# Patient Record
Sex: FEMALE | Race: OTHER | Employment: STUDENT | URBAN - METROPOLITAN AREA
[De-identification: names, ages, dates, MRNs, and addresses within clinical notes are randomized per-mention and may not be internally consistent; named-entity substitution may affect disease eponyms.]

---
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
## 2023-01-19 NOTE — PROGRESS NOTES
Agnieszka Ge (:  2005) is a 19 y.o. female,New patient, here for evaluation of the following chief complaint(s):  New Patient    History significant for anxiety. ASSESSMENT/PLAN:  1. Anxiety-controlled. Patient has been experiencing anxiety attack for almost 5 to 6 years. 2 years ago there was a discussion for possibly starting medication but the patient opted not to at that time. Lately for the past 6 to 8 months, her anxiety is getting worse. It is mainly because she lost 3 of her relatives suddenly. Her older cousin was drawn after having seizure episode in the pool, other concern passed in sleep and one of her aunt passed in an accident. She gets disturbed sleep. Her appetite is low and she likes to snacks intermittently between. CBT, will start on BuSpar. Referral to psychologist made. -     Ambulatory referral to Psychology  -     busPIRone (BUSPAR) 5 MG tablet; Take 1 tablet by mouth 2 times daily, Disp-60 tablet, R-2Normal  2. Sleep disturbance-likely anxiety related. Recommended to make adequate sleep hygiene, fixed time to go to bed and avoid daytime naps. Patient agreeable. -     Ambulatory referral to Psychology    Return in about 3 months (around 2023). Subjective   SUBJECTIVE/OBJECTIVE:  HPI    Review of Systems   Constitutional:  Negative for chills, fatigue and fever. HENT:  Negative for congestion, ear pain, rhinorrhea and sore throat. Eyes:  Negative for discharge, redness and visual disturbance. Respiratory:  Negative for cough and chest tightness. Cardiovascular:  Negative for chest pain, palpitations and leg swelling. Gastrointestinal:  Negative for abdominal pain, nausea and vomiting. Endocrine: Negative. Genitourinary:  Negative for dysuria. Musculoskeletal:  Negative for back pain and gait problem. Skin: Negative. Neurological:  Negative for syncope, facial asymmetry, speech difficulty, light-headedness and headaches. Psychiatric/Behavioral:  Positive for sleep disturbance. Objective   Physical Exam  Vitals reviewed. Constitutional:       Appearance: Normal appearance. HENT:      Head: Normocephalic. Eyes:      Extraocular Movements: Extraocular movements intact. Pupils: Pupils are equal, round, and reactive to light. Cardiovascular:      Rate and Rhythm: Normal rate. Heart sounds: Normal heart sounds. No murmur heard. Pulmonary:      Effort: Pulmonary effort is normal.      Breath sounds: Normal breath sounds. Abdominal:      General: Bowel sounds are normal.      Palpations: Abdomen is soft. Musculoskeletal:         General: Normal range of motion. Skin:     General: Skin is warm. Neurological:      General: No focal deficit present. Mental Status: She is alert and oriented to person, place, and time. Mental status is at baseline. Psychiatric:         Mood and Affect: Mood normal.                An electronic signature was used to authenticate this note.     --Roque Dave MD

## 2023-01-20 ENCOUNTER — OFFICE VISIT (OUTPATIENT)
Dept: INTERNAL MEDICINE CLINIC | Age: 18
End: 2023-01-20
Payer: COMMERCIAL

## 2023-01-20 VITALS
SYSTOLIC BLOOD PRESSURE: 124 MMHG | OXYGEN SATURATION: 98 % | WEIGHT: 192.12 LBS | TEMPERATURE: 97.5 F | HEART RATE: 79 BPM | DIASTOLIC BLOOD PRESSURE: 65 MMHG

## 2023-01-20 DIAGNOSIS — G47.9 SLEEP DISTURBANCE: ICD-10-CM

## 2023-01-20 DIAGNOSIS — F41.9 ANXIETY: Primary | ICD-10-CM

## 2023-01-20 PROCEDURE — G8421 BMI NOT CALCULATED: HCPCS | Performed by: STUDENT IN AN ORGANIZED HEALTH CARE EDUCATION/TRAINING PROGRAM

## 2023-01-20 PROCEDURE — G8427 DOCREV CUR MEDS BY ELIG CLIN: HCPCS | Performed by: STUDENT IN AN ORGANIZED HEALTH CARE EDUCATION/TRAINING PROGRAM

## 2023-01-20 PROCEDURE — 1036F TOBACCO NON-USER: CPT | Performed by: STUDENT IN AN ORGANIZED HEALTH CARE EDUCATION/TRAINING PROGRAM

## 2023-01-20 PROCEDURE — G8484 FLU IMMUNIZE NO ADMIN: HCPCS | Performed by: STUDENT IN AN ORGANIZED HEALTH CARE EDUCATION/TRAINING PROGRAM

## 2023-01-20 PROCEDURE — 99204 OFFICE O/P NEW MOD 45 MIN: CPT | Performed by: STUDENT IN AN ORGANIZED HEALTH CARE EDUCATION/TRAINING PROGRAM

## 2023-01-20 RX ORDER — BUSPIRONE HYDROCHLORIDE 5 MG/1
5 TABLET ORAL 2 TIMES DAILY
Qty: 60 TABLET | Refills: 2 | Status: SHIPPED | OUTPATIENT
Start: 2023-01-20 | End: 2023-04-20

## 2023-01-20 RX ORDER — BUSPIRONE HYDROCHLORIDE 5 MG/1
5 TABLET ORAL 2 TIMES DAILY
Qty: 60 TABLET | Refills: 0 | Status: SHIPPED | OUTPATIENT
Start: 2023-01-20 | End: 2023-01-20

## 2023-01-20 SDOH — ECONOMIC STABILITY: FOOD INSECURITY: WITHIN THE PAST 12 MONTHS, YOU WORRIED THAT YOUR FOOD WOULD RUN OUT BEFORE YOU GOT MONEY TO BUY MORE.: NEVER TRUE

## 2023-01-20 SDOH — ECONOMIC STABILITY: FOOD INSECURITY: WITHIN THE PAST 12 MONTHS, THE FOOD YOU BOUGHT JUST DIDN'T LAST AND YOU DIDN'T HAVE MONEY TO GET MORE.: NEVER TRUE

## 2023-01-20 ASSESSMENT — PATIENT HEALTH QUESTIONNAIRE - PHQ9
SUM OF ALL RESPONSES TO PHQ QUESTIONS 1-9: 1
1. LITTLE INTEREST OR PLEASURE IN DOING THINGS: 0
2. FEELING DOWN, DEPRESSED OR HOPELESS: 1
SUM OF ALL RESPONSES TO PHQ9 QUESTIONS 1 & 2: 1

## 2023-01-20 ASSESSMENT — ENCOUNTER SYMPTOMS
RHINORRHEA: 0
COUGH: 0
SORE THROAT: 0
EYE REDNESS: 0
VOMITING: 0
ABDOMINAL PAIN: 0
BACK PAIN: 0
CHEST TIGHTNESS: 0
EYE DISCHARGE: 0
NAUSEA: 0

## 2023-01-20 ASSESSMENT — SOCIAL DETERMINANTS OF HEALTH (SDOH): HOW HARD IS IT FOR YOU TO PAY FOR THE VERY BASICS LIKE FOOD, HOUSING, MEDICAL CARE, AND HEATING?: NOT HARD AT ALL

## 2023-02-03 ENCOUNTER — OFFICE VISIT (OUTPATIENT)
Dept: PSYCHOLOGY | Age: 18
End: 2023-02-03

## 2023-02-03 DIAGNOSIS — F33.2 SEVERE EPISODE OF RECURRENT MAJOR DEPRESSIVE DISORDER, WITHOUT PSYCHOTIC FEATURES (HCC): Primary | ICD-10-CM

## 2023-02-03 DIAGNOSIS — F43.21 GRIEF: ICD-10-CM

## 2023-02-03 ASSESSMENT — PATIENT HEALTH QUESTIONNAIRE - PHQ9
SUM OF ALL RESPONSES TO PHQ QUESTIONS 1-9: 21
9. THOUGHTS THAT YOU WOULD BE BETTER OFF DEAD, OR OF HURTING YOURSELF: 0
SUM OF ALL RESPONSES TO PHQ QUESTIONS 1-9: 21
7. TROUBLE CONCENTRATING ON THINGS, SUCH AS READING THE NEWSPAPER OR WATCHING TELEVISION: 2
4. FEELING TIRED OR HAVING LITTLE ENERGY: 3
1. LITTLE INTEREST OR PLEASURE IN DOING THINGS: 3
6. FEELING BAD ABOUT YOURSELF - OR THAT YOU ARE A FAILURE OR HAVE LET YOURSELF OR YOUR FAMILY DOWN: 2
SUM OF ALL RESPONSES TO PHQ9 QUESTIONS 1 & 2: 5
2. FEELING DOWN, DEPRESSED OR HOPELESS: 2
8. MOVING OR SPEAKING SO SLOWLY THAT OTHER PEOPLE COULD HAVE NOTICED. OR THE OPPOSITE, BEING SO FIGETY OR RESTLESS THAT YOU HAVE BEEN MOVING AROUND A LOT MORE THAN USUAL: 3
SUM OF ALL RESPONSES TO PHQ QUESTIONS 1-9: 21
SUM OF ALL RESPONSES TO PHQ QUESTIONS 1-9: 21
3. TROUBLE FALLING OR STAYING ASLEEP: 3
5. POOR APPETITE OR OVEREATING: 3

## 2023-02-03 ASSESSMENT — ANXIETY QUESTIONNAIRES
7. FEELING AFRAID AS IF SOMETHING AWFUL MIGHT HAPPEN: 3-NEARLY EVERY DAY
GAD7 TOTAL SCORE: 20
6. BECOMING EASILY ANNOYED OR IRRITABLE: 2-OVER HALF THE DAYS
4. TROUBLE RELAXING: 3-NEARLY EVERY DAY
5. BEING SO RESTLESS THAT IT IS HARD TO SIT STILL: 3-NEARLY EVERY DAY
3. WORRYING TOO MUCH ABOUT DIFFERENT THINGS: 3-NEARLY EVERY DAY
2. NOT BEING ABLE TO STOP OR CONTROL WORRYING: 3-NEARLY EVERY DAY
1. FEELING NERVOUS, ANXIOUS, OR ON EDGE: 3

## 2023-02-03 NOTE — PATIENT INSTRUCTIONS
The [facility]) Consultant giving you this message provides team-based care to treat the mind and body. He works directly with your doctor, who will always stay in charge of your care. Most [address] [place] [address] visits are 30 minutes or less. Usually, the [address] [place] [address] provider and your doctor continue to see you until you are starting to do better and have a good plan in place for continued progress. Once that happens, most patients follow-up with just their doctor (though the [address] [place] [address] provider remains available to you as needed). If you are not improving, or if you desire outside mental health treatment, or if your doctor recommends more specialized help, we will be happy to help you find a mental health specialist in the community. Please also note your health insurance may be billed for [facility] visits. Check with your insurance company, and tell the [address] [place] [address] provider, if you have any questions about your [address] [place] [address] coverage. Diaphragmatic Breathing Exercises    Exercise 1:  The Stimulating Breath (also called the Vaibhav Breath)  This exercise aims to raise energy level and increase alertness. Inhale and exhale rapidly through your nose, keeping your mouth closed but relaxed. Your breaths in and out should be equal in duration, but as short as possible. This is a noisy breathing exercise. Try for three in-and-out breath cycles per second. This produces a quick movement of the diaphragm, suggesting a vaibhav. Breathe normally after each cycle. Do not do for more than 15 seconds on your first try. Each time you practice the Stimulating Breath, you can increase your time by five seconds or so, until you reach a full minute. If done properly, you may feel invigorated, comparable to the heightened awareness you feel after a good workout. You should feel the effort at the back of the neck, the diaphragm, the chest and the abdomen.  Try this breathing exercise the next time you need an energy boost and feel yourself reaching for a cup of coffee. Exercise 2:  The 4-7-8 (or Relaxing Breath) Exercise  This exercise is utterly simple, takes almost no time, requires no equipment and can be done anywhere. Although you can do the exercise in any position, sit with your back straight while learning the exercise. Place the tip of your tongue against the ridge of tissue just behind your upper front teeth, and keep it there through the entire exercise. You will be exhaling through your mouth around your tongue; try pursing your lips slightly if this seems awkward. Exhale completely through your mouth, making a whoosh sound. Close your mouth and inhale quietly through your nose to a mental count of four. Hold your breath for a count of seven. Exhale completely through your mouth, making a whoosh sound to a count of eight. This is one breath. Now inhale again and repeat the cycle three more times for a total of four breaths. Note that you always inhale quietly through your nose and exhale audibly through your mouth. The tip of your tongue stays in position the whole time. Exhalation takes twice as long as inhalation. The absolute time you spend on each phase is not important; the ratio of 4:7:8 is important. If you have trouble holding your breath, speed the exercise up but keep to the ratio of 4:7:8 for the three phases. With practice you can slow it all down and get used to inhaling and exhaling more and more deeply. This exercise is a natural tranquilizer for the nervous system. Unlike tranquilizing drugs, which are often effective when you first take them but then lose their power over time, this exercise is subtle when you first try it but gains in power with repetition and practice. Do it at least twice a day. You cannot do it too frequently. Do NOT do more than 4 breaths at one time for the first month of practice. Later, if you wish, you can extend it to eight breaths.  If you feel a little lightheaded when you first breathe this way, do not be concerned; it will pass. Once you develop this technique by practicing it every day, it will be a very useful tool that you will always have with you. Use it whenever anything upsetting happens - before you react. Use it whenever you are aware of internal tension. Use it to help you fall asleep. This exercise cannot be recommended too highly. Everyone can benefit from it. Exercise 3:   Meditation Exercise: Breath Counting  If you want to get a feel for this challenging work, try your hand at breath counting, a deceptively simple technique. Sit in a comfortable position with the spine straight and head inclined slightly forward. Gently close your eyes and take a few deep breaths. Then let the breath come naturally without trying to influence it. Ideally it will be quiet and slow, but depth and rhythm may vary. To begin the exercise, count \"one\" to yourself as you exhale. The next time you exhale, count \"two,\" and so on up to [place]. \"  Then begin a new cycle, counting \"one\" on the next exhalation. Never count higher than \"five,\" and count only when you exhale. You will know your attention has wandered when you find yourself up to \"eight,\" \"12,\" even \"19. \"  Try to do 10 minutes of this form of meditation. Personal Thought  Control. Our thinking often creates anxiety for us. Getting better control of our thinking can go a long way in helping us cope. The following steps can be useful. Let yourself become aware of thoughts you have when you are anxious. What are the words that you are saying to yourself at that moment? Sometimes it takes a little practice before we become aware of our thoughts. Some examples might be:  I know something bad is going to happen, or This is horrible or Mariluz Neff is this happening to me!?  Write your thoughts down. Its much easier to work with our thoughts, analyze them, and replace them if they are in black and white.   Ask yourself the following questions about your thoughts:  Is it true? (Is it logically correct? Where is the evidence to support the truth of that thought? Are there alternative ways of thinking that would be more correct?). If a thought is not as true as it could be, replace it with a more realistic and helpful one. The majority of thoughts we have that generate anxiety are not the most realistic appraisals of the situation. So what? (If this is logically correct, what does it mean to me? Is there anything I can do about the situation? Is it in my best interest to get anxious about this?). Use coping self-statements. When feeling anxious, you may be able to tell yourself automatic phrases without thinking too much about it. A couple of examples would be phrases such as Its OK, I can handle it, or Ive been through things like this before and have done all right.   Notice that these statements tend to be true for all of us. Notice a change in your emotional state as you change your thinking. As your thoughts become more realistic, you will probably notice a decrease in anxiety and tension, and an increase in your ability to cope. Thinking Errors That Increase Stress, Anger, Depression, Anxiety, and Worry  All-or-Nothing Thinking. You see things in black-and-white categories. It is either one thing or another; there is no room for anything in between. I'm 100% healthy or I must have a fatal disease.   Jumping to Conclusions. You make a negative interpretation even though there are no definite facts that convincingly support your conclusion. My  is late because he has been in a car accident and is now injured on the side of the road.   Fortune-Telling. You anticipate things will turn out badly, convinced the prediction is a fact. Not getting this job will cause us to lose the house.   Should Statements. Musts and oughts are also offenders. Emotional consequences can include anxiety and anger.  I should be able to handle this.    Overgeneralization. Assuming one event is actually a pattern. My hand is a little shaky today, I must have Parkinson's disease.   Disqualifying the Positive. Filtering out or rejecting positive experiences to maintain negative beliefs. Upon hearing that your spouse has checked all the doors and windows and they are all locked you think, But someone could cut out a piece of glass and open the window.   Catastrophizing. Predicting the worst possible outcome imaginable. Terrible, awful, horrible, worst ever might be key words. If I can't get my heart to stop pounding I'm going to die.    Superstitious Thinking. The thought that something you do prevents something awful from happening. Jeevan Grupo my spouse a hug and telling her to be careful before going to work will prevent her from getting in a wreck. I do it every morning and she hasn't gotten in a wreck yet.   Emotional Reasoning. The belief that because you feel a certain way means that the assumptions and association you have with that feeling are true. The fear, doom, and constant anxiety must mean something is seriously wrong with me. 

## 2023-02-03 NOTE — PROGRESS NOTES
Behavioral Health Consultation  Dasha Garcia, Ph.D.  Psychologist  2/3/2023  9:30 AM EST      Time spent with Patient: 30 minutes  This is patient's first [facility] appointment. Reason for Consult: depression; anxiety; grief  Referring Provider: No primary care provider on file. No primary provider on file. Feedback for PCP:     Pt provided informed consent for the behavioral health program. Discussed with patient model of service to include the limits of confidentiality (i.e. abuse reporting, suicide intervention, etc.) and short-term intervention focused approach. Pt indicated understanding. Feedback given to PCP. S:  The patient's PHQ is 21, which is in the severe depression range. The patient presents with multiple psychosocial stressors, with multiple deaths in her family and extended family from 2017 to a death this year. She said that a cousin her age  this year, and that her death shook due to they are both the same age. She also says that she has social anxiety disorder that frequently makes communication anxiety producing. She says that her \"people skills\" could use some help. She works at a , which she enjoys as the kids keep her busy. She says everyday tasks are stressful and that it's hard for her to focus on self care.          Mental health history:     Social History     Tobacco Use    Smoking status: Never    Smokeless tobacco: Never   Substance Use Topics    Alcohol use: Never        Illicit drugs:   Social History     Substance and Sexual Activity   Drug Use Yes    Types: Marijuana [employer])    Comment: ocassionally        O:  MSE:  Appearance: good hygiene   Attitude: cooperative  Consciousness: alert  Orientation: oriented to person, place, time, general circumstance  Memory: recent and remote memory intact  Attention/Concentration: intact during session  Psychomotor Activity: normal  Eye Contact: normal  Speech: low volume  Mood: introverted, anxious   Affect: congruent  Perception: within normal limits  Thought Content: within normal limits  Thought Process: logical, coherent and goal-directed  Insight: good  Judgment: intact  Ability to understand instructions: Yes  Ability to respond meaningfully: Yes  Morbid Ideation: no   Suicide Assessment: no suicidal ideation, plan, or intent  Homicidal Ideation: no    A:  We talked about social anxiety and she was given some handouts to help with emotion regulation. We also talked about some strategies around social anxiety. We talked about grief and that sometimes it's helpful to try to distinguish it from depression. In subsequent visits, we'll collect more psychosocial history with which to create a behavioral change plan. EVELYN 7 SCORE 2/3/2023   EVELYN-7 Total Score 20     Interpretation of EVELYN-7 score: 5-9 = mild anxiety, 10-14 = moderate anxiety, 15+ = severe anxiety. Recommend referral to behavioral health for scores 10 or greater. PHQ Scores 2/3/2023 1/20/2023   PHQ2 Score 5 1   PHQ9 Score 21 1     Interpretation of Total Score Depression Severity: 1-4 = Minimal depression, 5-9 = Mild depression, 10-14 = Moderate depression, 15-19 = Moderately severe depression, 20-27 = Severe depression    Diagnosis:    1. Severe episode of recurrent major depressive disorder, without psychotic features ([facility] [address]    2. Grief        There is no problem list on file for this patient. Plan:  Pt interventions:  Established rapport, [place]-setting to identify pt's primary goals for [address] visit / overall health, Supportive techniques, Provided Psychoeducation re: treating anxiety and depression, and Provided handout on stress management techniques.        Pt Behavioral Change Plan:  Pt set the following goals:  Pt scheduled F/U visit in 3 weeks  See section 'A'